# Patient Record
Sex: MALE | Race: WHITE | URBAN - METROPOLITAN AREA
[De-identification: names, ages, dates, MRNs, and addresses within clinical notes are randomized per-mention and may not be internally consistent; named-entity substitution may affect disease eponyms.]

---

## 2019-09-25 ENCOUNTER — THERAPY VISIT (OUTPATIENT)
Dept: PHYSICAL THERAPY | Facility: CLINIC | Age: 44
End: 2019-09-25
Payer: COMMERCIAL

## 2019-09-25 DIAGNOSIS — M54.16 LUMBAR RADICULOPATHY: ICD-10-CM

## 2019-09-25 PROCEDURE — 97530 THERAPEUTIC ACTIVITIES: CPT | Mod: GP | Performed by: PHYSICAL THERAPIST

## 2019-09-25 PROCEDURE — 97161 PT EVAL LOW COMPLEX 20 MIN: CPT | Mod: GP | Performed by: PHYSICAL THERAPIST

## 2019-09-25 PROCEDURE — 97110 THERAPEUTIC EXERCISES: CPT | Mod: GP | Performed by: PHYSICAL THERAPIST

## 2019-09-25 NOTE — LETTER
[facility] PHYSICAL THERAPY  [address] [address]  [phone]    2019    Re: Joe Quintana   :   1975  MRN:  [id number]   REFERRING PHYSICIAN:   Ashleigh Castro    [facility] PHYSICAL THERAPY  Date of Initial Evaluation:  19  Visits:  Rxs Used: 1  Reason for Referral:  Lumbar radiculopathy    EVALUATION SUMMARY    [facility] Initial Evaluation  Subjective:  The history is provided by the patient.   Type of problem:  Lumbar    This is a chronic condition   Problem details: Pt reports a 10 year history of LBP and bilat LE symptoms. Started in  randomly and has been persisting ever since. Currently describes pain in low back across belt line and gets numbness/tingling down lateral thighs in both legs along with hypersensitivity and a lot of tightness in hamstrings. Pain worse with standing within 5-10 min, walking within short distances,  (parking lot to building), sitting for long periods of time, sleeping (wakes about 6x/night), avoids lifting/bending. Pain better with steroid dose pack, doing pool therapy/walking, e-stim. 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             and reported as 7/10 on pain scale. General health as reported by patient is fair. Pertinent medical history includes:  Depression, high blood pressure, mental illness, numbness/tingling, overweight and sleep disorder/apnea.      Current medications:  Anti-depressants, high blood pressure medication, pain medication and steroids.              Patient is Teacher/.     Lumbar/SI Evaluation  ROM:    AROM Lumbar:   Flexion:          Mid shin, 6-7/10 pain  Ext:                    100% ROM, 6-7/10   Side Bend:        Left:  To knee, 3-4/10 pain    Right:  To knee, no pain  Rotation:           Left:     Right:   Side Glide:        Left:     Right:         Lumbar Myotomes:  normal    Lumbar Dermtomes:    L5 Left:  Hypo-light touch     L5 Right:   Hypo-light touch    Neural Tension/Mobility:    Left side:  Slump positive.   Right side:   Slump positive.            Re: Joe Quintana   :   1975    Assessment/Plan:    Patient is a 44 year old male with lumbar complaints.    Patient has the following significant findings with corresponding treatment plan.                Diagnosis 1:  LBP  Pain -  hot/cold therapy, US, electric stimulation, mechanical traction, manual therapy, splint/taping/bracing/orthotics, self management, education, directional preference exercise and home program  Decreased ROM/flexibility - manual therapy, therapeutic exercise, therapeutic activity and home program  Decreased joint mobility - manual therapy, therapeutic exercise, therapeutic activity and home program  Decreased strength - therapeutic exercise, therapeutic activities and home program  Impaired muscle performance - neuro re-education and home program  Decreased function - therapeutic activities and home program  Diagnosis 2:  BLE radiculopathy   Pain -  hot/cold therapy, US, electric stimulation, mechanical traction, manual therapy, splint/taping/bracing/orthotics, self management, education, directional preference exercise and home program  Decreased ROM/flexibility - manual therapy, therapeutic exercise, therapeutic activity and home program  Decreased joint mobility - manual therapy, therapeutic exercise, therapeutic activity and home program  Decreased strength - therapeutic exercise, therapeutic activities and home program  Impaired muscle performance - neuro re-education and home program  Decreased function - therapeutic activities and home program    Therapy Evaluation Codes:   1) History comprised of:   Personal factors that impact the plan of care:      None.    Comorbidity factors that impact the plan of care are:      Depression, High blood pressure, Mental illness, Numbness/tingling, Overweight and Sleep disorder/apnea.     Medications impacting care:  Anti-depressant, High blood pressure, Pain and Steroids.  2) Examination of Body Systems comprised of:   Body structures and functions that impact the plan of care:      Lumbar spine.   Activity limitations that impact the plan of care are:      Bathing, Driving, Lifting, Sitting, Squatting/kneeling, Stairs, Standing, Walking, Sleeping and Laying down.  3) Clinical presentation characteristics are:   Stable/Uncomplicated.  4) Decision-Making    Low complexity using standardized patient assessment instrument and/or measureable assessment of functional outcome.  Cumulative Therapy Evaluation is: Low complexity.    Previous and current functional limitations:  (See Goal Flow Sheet for this information)    Short term and Long term goals: (See Goal Flow Sheet for this information)     Communication ability:  Patient appears to be able to clearly communicate and understand verbal and written communication and follow directions correctly.  Treatment Explanation - The following has been discussed with the patient:   RX ordered/plan of care  Anticipated outcomes  Possible risks and side effects  This patient would benefit from PT intervention to resume normal activities.   Rehab potential is good.  Re: Joe Allengoon   :   1975    Frequency:  1 X week, once daily  Duration:  for 8 weeks  Discharge Plan:  Achieve all LTG.  Independent in home treatment program.  Reach maximal therapeutic benefit.    Please refer to the daily flowsheet for treatment today, total treatment time and time spent performing 1:1 timed codes.         Thank you for your referral.    INQUIRIES  Therapist: IGNACIO Gonsalez [facility] PHYSICAL THERAPY  [address] [address]  Phone: [phone]  Fax: [phone]

## 2019-10-09 ENCOUNTER — TELEPHONE (OUTPATIENT)
Dept: PHYSICAL THERAPY | Facility: CLINIC | Age: 44
End: 2019-10-09

## 2019-12-12 PROBLEM — M54.16 LUMBAR RADICULOPATHY: Status: RESOLVED | Noted: 2019-09-25 | Resolved: 2019-12-12

## 2021-05-24 ENCOUNTER — RECORDS - HEALTHEAST (OUTPATIENT)
Dept: ADMINISTRATIVE | Facility: CLINIC | Age: 46
End: 2021-05-24

## 2021-05-25 ENCOUNTER — RECORDS - HEALTHEAST (OUTPATIENT)
Dept: ADMINISTRATIVE | Facility: CLINIC | Age: 46
End: 2021-05-25

## 2022-11-29 NOTE — TELEPHONE ENCOUNTER
Pt did not show up for scheduled apt today. Called and informed of no show and to call and confirm next apt. Gave clinic number.    Isaías Cardona, PT on 10/9/2019 at 4:00 PM  
yes...